# Patient Record
Sex: FEMALE | NOT HISPANIC OR LATINO | URBAN - METROPOLITAN AREA
[De-identification: names, ages, dates, MRNs, and addresses within clinical notes are randomized per-mention and may not be internally consistent; named-entity substitution may affect disease eponyms.]

---

## 2024-10-04 ENCOUNTER — APPOINTMENT (OUTPATIENT)
Dept: PRIMARY CARE | Facility: CLINIC | Age: 54
End: 2024-10-04

## 2024-10-04 VITALS
HEART RATE: 59 BPM | OXYGEN SATURATION: 91 % | WEIGHT: 278 LBS | BODY MASS INDEX: 39.8 KG/M2 | HEIGHT: 70 IN | SYSTOLIC BLOOD PRESSURE: 99 MMHG | DIASTOLIC BLOOD PRESSURE: 63 MMHG

## 2024-10-04 DIAGNOSIS — R79.89 HIGH SERUM PROGESTERONE: ICD-10-CM

## 2024-10-04 DIAGNOSIS — N62 GYNECOMASTIA: Primary | ICD-10-CM

## 2024-10-04 PROCEDURE — 99203 OFFICE O/P NEW LOW 30 MIN: CPT

## 2024-10-04 PROCEDURE — 1036F TOBACCO NON-USER: CPT

## 2024-10-04 PROCEDURE — 3008F BODY MASS INDEX DOCD: CPT

## 2024-10-04 RX ORDER — OLOPATADINE HYDROCHLORIDE 665 UG/1
2 SPRAY NASAL 2 TIMES DAILY
COMMUNITY
Start: 2023-12-01

## 2024-10-04 RX ORDER — FLUTICASONE PROPIONATE 50 MCG
2 SPRAY, SUSPENSION (ML) NASAL DAILY
COMMUNITY

## 2024-10-04 RX ORDER — FINASTERIDE 5 MG/1
5 TABLET, FILM COATED ORAL DAILY
COMMUNITY

## 2024-10-04 RX ORDER — METOPROLOL SUCCINATE 50 MG/1
15 TABLET, EXTENDED RELEASE ORAL DAILY
COMMUNITY

## 2024-10-04 RX ORDER — LISINOPRIL 40 MG/1
40 TABLET ORAL DAILY
COMMUNITY

## 2024-10-04 RX ORDER — BUPROPION HYDROCHLORIDE 300 MG/1
300 TABLET ORAL
COMMUNITY

## 2024-10-04 RX ORDER — BUPROPION HYDROCHLORIDE 150 MG/1
TABLET ORAL
COMMUNITY

## 2024-10-04 RX ORDER — ALLOPURINOL 100 MG/1
200 TABLET ORAL 2 TIMES DAILY
COMMUNITY

## 2024-10-04 RX ORDER — MONTELUKAST SODIUM 10 MG/1
1 TABLET ORAL NIGHTLY
COMMUNITY
Start: 2024-01-29

## 2024-10-04 RX ORDER — TAMSULOSIN HYDROCHLORIDE 0.4 MG/1
0.4 CAPSULE ORAL DAILY
COMMUNITY

## 2024-10-04 RX ORDER — ATORVASTATIN CALCIUM 80 MG/1
80 TABLET, FILM COATED ORAL NIGHTLY
COMMUNITY

## 2024-10-04 RX ORDER — AMLODIPINE BESYLATE 10 MG/1
10 TABLET ORAL DAILY
COMMUNITY

## 2024-10-04 RX ORDER — FLUTICASONE PROPIONATE AND SALMETEROL 250; 50 UG/1; UG/1
1 POWDER RESPIRATORY (INHALATION) 2 TIMES DAILY
COMMUNITY
Start: 2024-07-12 | End: 2025-01-08

## 2024-10-04 ASSESSMENT — PATIENT HEALTH QUESTIONNAIRE - PHQ9
10. IF YOU CHECKED OFF ANY PROBLEMS, HOW DIFFICULT HAVE THESE PROBLEMS MADE IT FOR YOU TO DO YOUR WORK, TAKE CARE OF THINGS AT HOME, OR GET ALONG WITH OTHER PEOPLE: SOMEWHAT DIFFICULT
1. LITTLE INTEREST OR PLEASURE IN DOING THINGS: SEVERAL DAYS
2. FEELING DOWN, DEPRESSED OR HOPELESS: SEVERAL DAYS
SUM OF ALL RESPONSES TO PHQ9 QUESTIONS 1 AND 2: 2

## 2024-10-04 NOTE — PROGRESS NOTES
"Subjective   Patient ID: Venkatesh Ortiz is a 53 y.o. male who presents for progesterone (Patient states estrogen and progesterone levels are high. Previous doctor told him to just lose weight. He is looking for a 2nd opinion.).    HPI       Patient presents to the office for a second opinion in regards to lab work that was done at the [facility].  Patient states that he originally went there for complaints of a lump and pain in the left breast.  Patient states that he followed up with  And was told that he just needed to lose weight, as all lab work and imaging was for the most part unremarkable.  Patient states that since seeing the other provider he noticed that the lump and pain has resolved however the excess skin of the left breast seems to be more noticeable and he is concerned that that is something else going on.  Patient's wife that states that she has not noticed a huge change in the size.  Patient denies any other complaints.  Patient states that his diet has become more poor and he has gained roughly 5 to 10 pounds since his visit at the clinic.      Objective   BP 99/63   Pulse 59   Ht 1.778 m (5' 10\")   Wt 126 kg (278 lb)   SpO2 91%   BMI 39.89 kg/m²     Physical Exam  Constitutional:       Appearance: He is obese.   Chest:   Breasts:     Left: No mass, nipple discharge or tenderness.      Comments: Left breast at the 3 o'clock position slightly noted more tissue, however no mass or irregularity noted.   Musculoskeletal:         General: Normal range of motion.   Skin:     General: Skin is warm and dry.   Neurological:      General: No focal deficit present.      Mental Status: He is alert and oriented to person, place, and time.         I discussed with both the patient and his wife that I reviewed the lab work and imaging report that was done from the [facility] as well as an outside ultrasound facility and I have low suspicion that the elevated estrogen and progesterone are related " to any acute process.  Elevated progesterone could be related to the increase in hemoglobin A1c level.  Recommending continuing to monitor your diet and increase your exercise.  As we discussed, if you are to develop any worsening pain, tenderness or you are to feel a lump schedule an appointment soon as possible.    Assessment/Plan   Problem List Items Addressed This Visit             ICD-10-CM    Gynecomastia - Primary N62    High serum progesterone R79.89